# Patient Record
Sex: FEMALE | Race: WHITE | Employment: UNEMPLOYED | URBAN - METROPOLITAN AREA
[De-identification: names, ages, dates, MRNs, and addresses within clinical notes are randomized per-mention and may not be internally consistent; named-entity substitution may affect disease eponyms.]

---

## 2021-02-26 ENCOUNTER — HOSPITAL ENCOUNTER (OUTPATIENT)
Dept: NON INVASIVE DIAGNOSTICS | Age: 51
Discharge: HOME | End: 2021-02-26
Payer: COMMERCIAL

## 2021-02-26 ENCOUNTER — HOSPITAL ENCOUNTER (OUTPATIENT)
Dept: CT IMAGING | Age: 51
Discharge: HOME | End: 2021-02-26
Payer: COMMERCIAL

## 2021-02-26 DIAGNOSIS — J32.0 CHRONIC MAXILLARY SINUSITIS: ICD-10-CM

## 2021-02-26 LAB
LV EF: 58 %
LVEF MODALITY: NORMAL

## 2021-02-26 PROCEDURE — 70486 CT MAXILLOFACIAL W/O DYE: CPT

## 2021-02-26 PROCEDURE — 93306 TTE W/DOPPLER COMPLETE: CPT

## 2021-03-09 ENCOUNTER — HOSPITAL ENCOUNTER (OUTPATIENT)
Age: 51
Discharge: HOME | End: 2021-03-09
Payer: COMMERCIAL

## 2021-03-09 ENCOUNTER — HOSPITAL ENCOUNTER (OUTPATIENT)
Dept: GENERAL RADIOLOGY | Age: 51
Discharge: HOME | End: 2021-03-09
Payer: COMMERCIAL

## 2021-03-09 DIAGNOSIS — R07.89 OTHER CHEST PAIN: ICD-10-CM

## 2021-03-09 PROCEDURE — 71046 X-RAY EXAM CHEST 2 VIEWS: CPT

## 2021-04-09 ENCOUNTER — OFFICE VISIT (OUTPATIENT)
Dept: CARDIOLOGY CLINIC | Age: 51
End: 2021-04-09
Payer: COMMERCIAL

## 2021-04-09 VITALS
OXYGEN SATURATION: 96 % | HEART RATE: 72 BPM | RESPIRATION RATE: 16 BRPM | BODY MASS INDEX: 20.59 KG/M2 | HEIGHT: 67 IN | SYSTOLIC BLOOD PRESSURE: 120 MMHG | DIASTOLIC BLOOD PRESSURE: 70 MMHG | WEIGHT: 131.2 LBS

## 2021-04-09 DIAGNOSIS — I51.89 LEFT ATRIAL MASS: ICD-10-CM

## 2021-04-09 DIAGNOSIS — R07.9 CHEST PAIN, UNSPECIFIED TYPE: Primary | ICD-10-CM

## 2021-04-09 PROCEDURE — G8427 DOCREV CUR MEDS BY ELIG CLIN: HCPCS | Performed by: INTERNAL MEDICINE

## 2021-04-09 PROCEDURE — 99204 OFFICE O/P NEW MOD 45 MIN: CPT | Performed by: INTERNAL MEDICINE

## 2021-04-09 PROCEDURE — G8420 CALC BMI NORM PARAMETERS: HCPCS | Performed by: INTERNAL MEDICINE

## 2021-04-09 RX ORDER — OMEPRAZOLE 20 MG/1
20 CAPSULE, DELAYED RELEASE ORAL
Qty: 90 CAPSULE | Refills: 3 | Status: SHIPPED | OUTPATIENT
Start: 2021-04-09

## 2021-04-09 RX ORDER — LEVOTHYROXINE SODIUM 0.03 MG/1
TABLET ORAL
COMMUNITY
Start: 2021-02-16

## 2021-04-09 RX ORDER — AZELASTINE 1 MG/ML
SPRAY, METERED NASAL
COMMUNITY
Start: 2021-02-16

## 2021-04-09 RX ORDER — MEDROXYPROGESTERONE ACETATE 2.5 MG/1
TABLET ORAL
COMMUNITY
Start: 2021-02-19

## 2021-04-09 RX ORDER — FLUTICASONE PROPIONATE 50 MCG
1 SPRAY, SUSPENSION (ML) NASAL DAILY
COMMUNITY

## 2021-04-09 RX ORDER — CETIRIZINE HYDROCHLORIDE 10 MG/1
10 TABLET ORAL DAILY
COMMUNITY

## 2021-04-09 RX ORDER — ESTRADIOL 1 MG/1
1 TABLET ORAL DAILY
COMMUNITY

## 2021-04-09 SDOH — HEALTH STABILITY: MENTAL HEALTH: HOW OFTEN DO YOU HAVE A DRINK CONTAINING ALCOHOL?: NOT ASKED

## 2021-04-09 SDOH — HEALTH STABILITY: MENTAL HEALTH: HOW MANY STANDARD DRINKS CONTAINING ALCOHOL DO YOU HAVE ON A TYPICAL DAY?: NOT ASKED

## 2021-04-09 NOTE — PROGRESS NOTES
[address]  Cardiac Consult     Referring Provider:  YNES Mustafa     Chief Complaint   Patient presents with    New Patient     Chest pain- ECHO results        History of Present Illness:  45 y.o. female seen in consultation at the request of Yuly Katz for chest pain and abnormal ECHO. She has been having mid chest tightness for several weeks. It seem to be exacerbated by stress. Also seemed worsened by caffeine. Stopped caffeine a few weeks ago and improved but no resolved. No other clear exacerbating or relieving factors. Due to this her PCP ordered and ECHO it showed a possible mass vs artifact in the left atrium. Past Medical History:   has no past medical history on file. Surgical History:   has no past surgical history on file. Social History:  Social History     Tobacco Use    Smoking status: Never Smoker    Smokeless tobacco: Never Used   Substance Use Topics    Alcohol use: Not Currently        Family History:  Negative for premature CAD    Allergies:  Sulfa antibiotics and Penicillins     Home Medications:  Prior to Visit Medications    Medication Sig Taking? Authorizing Provider   azelastine (ASTELIN) 0.1 % nasal spray USE 1 SPRAY IN EACH NOSTRIL TWICE DAILY Yes Historical Provider, MD   cetirizine (ZYRTEC) 10 MG tablet Take 10 mg by mouth daily Yes Historical Provider, MD   estradiol (ESTRACE) 1 MG tablet Take 1 mg by mouth daily Yes Historical Provider, MD   fluticasone (FLONASE) 50 MCG/ACT nasal spray 1 spray by Nasal route daily Yes Historical Provider, MD   levothyroxine (SYNTHROID) 25 MCG tablet TAKE 1 TABLET BY MOUTH EVERY DAY IN THE MORNING ON AN EMPTY STOMACH Yes Historical Provider, MD   medroxyPROGESTERone (PROVERA) 2.5 MG tablet  Yes Historical Provider, MD       [x] Medications and dosages reviewed.     ROS:  [x]Full ROS obtained and negative except as mentioned in HPI      Physical Examination:    Vitals:    04/09/21 0859   BP: 120/70   Site: Left Upper Arm   Position: Sitting   Cuff Size: Medium Adult   Pulse: 72   Resp: 16   SpO2: 96%   Weight: 131 lb 3.2 oz (59.5 kg)   Height: 5' 7\" (1.702 m)        · GENERAL: Well developed, well nourished, No acute distress  · NEUROLOGICAL: Alert and oriented  · PSYCH: Calm affect  · SKIN: Warm and dry, No visible rash,   · EYES: Pupils equal and round, Sclera non-icteric,   · HENT:  External ears and nose unremarkable, mucus membranes moist  · MUSCULOSKELETAL: Normal cephalic, neck supple  · CAROTID: Normal upstroke, no bruits  · CARDIAC: JVP normal, Normal PMI, regular rate and rhythm, normal S1S2, no murmur, rub, or gallop  · RESPIRATORY: Normal respiratory effort, clear to auscultation bilaterally  · EXTREMITIES: No edema  · GASTROINTESTINAL: normal bowel sounds, soft, non-tender, No hepatomegaly     ECHO  Summary   -Normal left ventricle size, wall thickness, and systolic function with an   estimated ejection fraction of 55-60%. -Normal diastolic function.   -Aortic valve appears sclerotic but opens adequately. -Trivial tricuspid regurgitation with a PASP of 18 mmHg.   -Trivial pulmonic regurgitation present   -There is a mobile density seen in one view in the left atrium. This may   represent reverberation artifact, however, a mass cannot be excluded. If   clinically indicated a CHARLIE could be considered for evaluation. EKG 2/21 (Tracings reviewed)  NSR, normal    Assessment:     Chest Pain:  Atypical. Doubt CAD. Check GXT    GERD:  Some issues with GERD  Start omeprozole 20 mg    Possible left atrial mass:   Reviewed with pt and  including ECHO images. Discussed options. I recommended CHARLIE. She is very hesitant to have this and would prefer MRI. I called and discussed with Dr. Raul Hill. He also feels CHARLIE would be preferable, but he likely could tell if a mass is present on MRI, although it is small. She was considering CHARLIE but refuses to have COVID testing.  There is a question of facial nerve injury from a prior COVID test.  Plan MRI        Plan:  Cardiac MRI  GXT  Omeprazole 20  F/u to review    Thank you for allowing me to participate in the care of this individual.      Gaetano Serrano M.D., [facility] [place]

## 2021-05-03 ENCOUNTER — TELEPHONE (OUTPATIENT)
Dept: CARDIOLOGY CLINIC | Age: 51
End: 2021-05-03

## 2021-05-03 ENCOUNTER — HOSPITAL ENCOUNTER (OUTPATIENT)
Dept: MRI IMAGING | Age: 51
Discharge: HOME | End: 2021-05-03
Payer: COMMERCIAL

## 2021-05-03 DIAGNOSIS — I51.89 LEFT ATRIAL MASS: ICD-10-CM

## 2021-05-03 LAB
LV EF: 57 %
LVEF MODALITY: NORMAL

## 2021-05-03 PROCEDURE — 75565 CARD MRI VELOC FLOW MAPPING: CPT | Performed by: INTERNAL MEDICINE

## 2021-05-03 PROCEDURE — 75561 CARDIAC MRI FOR MORPH W/DYE: CPT

## 2021-05-03 PROCEDURE — 75561 CARDIAC MRI FOR MORPH W/DYE: CPT | Performed by: INTERNAL MEDICINE

## 2021-05-03 PROCEDURE — A9585 GADOBUTROL INJECTION: HCPCS | Performed by: INTERNAL MEDICINE

## 2021-05-03 PROCEDURE — 6360000004 HC RX CONTRAST MEDICATION: Performed by: INTERNAL MEDICINE

## 2021-05-03 RX ADMIN — GADOBUTROL 9 ML: 604.72 INJECTION INTRAVENOUS at 09:30

## 2021-05-03 NOTE — TELEPHONE ENCOUNTER
LMOM for patient regarding results and fu 5/5. Asked her to call back to discuss. ----- Message from Josefa Christine MD sent at 5/3/2021 12:37 PM EDT -----  MRI was normal. No mass seen. F/u as planned to discuss.     1 [facility]

## 2021-05-03 NOTE — TELEPHONE ENCOUNTER
----- Message from Deidre Brandt MD sent at 5/3/2021 12:37 PM EDT -----  MRI was normal. No mass seen. F/u as planned to discuss.     MMK    Spoke with the patient and advised her of the message above per MMK and RN . Patient voiced understanding and will be at appt on 05/05.

## 2021-05-05 ENCOUNTER — TELEPHONE (OUTPATIENT)
Dept: CARDIOLOGY CLINIC | Age: 51
End: 2021-05-05

## 2021-05-05 ENCOUNTER — OFFICE VISIT (OUTPATIENT)
Dept: CARDIOLOGY CLINIC | Age: 51
End: 2021-05-05
Payer: COMMERCIAL

## 2021-05-05 VITALS
WEIGHT: 131 LBS | RESPIRATION RATE: 18 BRPM | HEART RATE: 81 BPM | OXYGEN SATURATION: 98 % | SYSTOLIC BLOOD PRESSURE: 92 MMHG | HEIGHT: 67 IN | BODY MASS INDEX: 20.56 KG/M2 | DIASTOLIC BLOOD PRESSURE: 60 MMHG

## 2021-05-05 DIAGNOSIS — I51.89 LEFT ATRIAL MASS: Primary | ICD-10-CM

## 2021-05-05 PROCEDURE — 99214 OFFICE O/P EST MOD 30 MIN: CPT | Performed by: INTERNAL MEDICINE

## 2021-05-05 PROCEDURE — 1036F TOBACCO NON-USER: CPT | Performed by: INTERNAL MEDICINE

## 2021-05-05 PROCEDURE — G8427 DOCREV CUR MEDS BY ELIG CLIN: HCPCS | Performed by: INTERNAL MEDICINE

## 2021-05-05 PROCEDURE — 3017F COLORECTAL CA SCREEN DOC REV: CPT | Performed by: INTERNAL MEDICINE

## 2021-05-05 PROCEDURE — G8420 CALC BMI NORM PARAMETERS: HCPCS | Performed by: INTERNAL MEDICINE

## 2021-05-05 RX ORDER — GABAPENTIN 100 MG/1
CAPSULE ORAL
COMMUNITY
Start: 2021-04-08

## 2021-05-05 RX ORDER — CIPROFLOXACIN 500 MG/1
TABLET, FILM COATED ORAL
COMMUNITY
Start: 2021-05-04

## 2021-05-05 SDOH — HEALTH STABILITY: MENTAL HEALTH: HOW OFTEN DO YOU HAVE A DRINK CONTAINING ALCOHOL?: MONTHLY OR LESS

## 2021-05-05 NOTE — PROGRESS NOTES
-Normal LV global longitudinal strain (GLS)    -Normal right ventricular size and systolic function with a calculated ejection fraction of 62% by Miranda's method.   -No intracardiac shunt. Calculated Qp:Qs:1 (Phase contrast velocity encoding Ao/Pa)   -Normal myocardial resting perfusion imaging.   -On delayed enhancement imaging, there is no abnormal hyperenhancement to suggest myocardial scar/inflammation/infiltration.       The MRI sequences and imaging planes in this study were tailored for cardiac imaging and are suboptimal for evaluation of non-cardiac structures. Assessment:     Chest Pain:  Resolved. Follow    GERD:  Improved    Possible left atrial mass:   MRI with no mass  Repeat quick look ECHO in office and no mass  Appears to have been reverberation from aortic valve and artifactual.  No further evaluation indicated.   Discussed with pt and  in detail and reviewed images with them-35 mins spent       Plan:  Stable cardiac status  F/u prn    Thank you for allowing me to participate in the care of this individual.      Johann Bhatti M.D., [address]

## 2021-06-09 ENCOUNTER — OFFICE VISIT (OUTPATIENT)
Dept: ENT CLINIC | Age: 51
End: 2021-06-09
Payer: COMMERCIAL

## 2021-06-09 VITALS
DIASTOLIC BLOOD PRESSURE: 70 MMHG | WEIGHT: 131 LBS | BODY MASS INDEX: 20.52 KG/M2 | HEART RATE: 71 BPM | SYSTOLIC BLOOD PRESSURE: 102 MMHG

## 2021-06-09 DIAGNOSIS — M54.2 NECK PAIN: Chronic | ICD-10-CM

## 2021-06-09 DIAGNOSIS — G50.1 ATYPICAL FACIAL PAIN: Primary | Chronic | ICD-10-CM

## 2021-06-09 PROCEDURE — 1036F TOBACCO NON-USER: CPT | Performed by: OTOLARYNGOLOGY

## 2021-06-09 PROCEDURE — G8420 CALC BMI NORM PARAMETERS: HCPCS | Performed by: OTOLARYNGOLOGY

## 2021-06-09 PROCEDURE — G8427 DOCREV CUR MEDS BY ELIG CLIN: HCPCS | Performed by: OTOLARYNGOLOGY

## 2021-06-09 PROCEDURE — 99203 OFFICE O/P NEW LOW 30 MIN: CPT | Performed by: OTOLARYNGOLOGY

## 2021-06-09 PROCEDURE — 3017F COLORECTAL CA SCREEN DOC REV: CPT | Performed by: OTOLARYNGOLOGY

## 2021-06-09 ASSESSMENT — ENCOUNTER SYMPTOMS
SORE THROAT: 0
SINUS PAIN: 0
RHINORRHEA: 0

## 2021-06-09 NOTE — PROGRESS NOTES
[address] [facility]       NEW PATIENT VISIT      PCP:  YNES Kenney      REFERRED BY:   Lazarus Effect      CHIEF COMPLAINT  Chief Complaint   Patient presents with    Otalgia       HISTORY OF PRESENT ILLNESS       Mariya Vogel is a 45 y.o. female here for evaluation and treatment of Pain in right side of face including ear. Started in anterior cheek and went to ear. Seen a lot of doctors. CNP thought sinus issues and couple rounds of antibiotics and then sent to Dr. Kee Hinojosa.  looks at CT scan of sinuses and said have a deviated septum and a pocket of air somethere but said my sinuses looked okay. Seen by neurologist ,endodontist, dentist.  No causes found. In December went to emergency room and did a COVID-19 test. Very painful and shoved up so far felt like grinding or crunching sensation and face went numb. she said that's normal and would go away in 20 minutes. Pain is at 10 every day. Feels like something in face not supposed to be there creepy crawly feeling. Neurologist Chandrakant Alejo, said comibination of the test and sinuses. Fall and spring seasonal allergies. Sinus infection twice a year. REVIEW OF SYSTEMS   Review of Systems   Constitutional: Negative for chills and fever. HENT: Positive for congestion. Negative for ear discharge, ear pain, hearing loss, rhinorrhea, sinus pain, sore throat and tinnitus. PAST MEDICAL HISTORY    No past medical history on file. No past surgical history on file. EXAMINATION      Vitals:    06/09/21 1448   BP: 102/70   Site: Left Upper Arm   Position: Sitting   Cuff Size: Medium Adult   Pulse: 71   Weight: 131 lb (59.4 kg)         Physical Exam  Vitals reviewed. Constitutional:       General: She is awake. She is not in acute distress. Appearance: Normal appearance. She is well-developed. She is not ill-appearing or toxic-appearing.    HENT:      Head: Normocephalic and atraumatic. Salivary Glands: Right salivary gland is not diffusely enlarged or tender. Left salivary gland is not diffusely enlarged or tender. Right Ear: Tympanic membrane, ear canal and external ear normal.      Left Ear: Tympanic membrane, ear canal and external ear normal.      Nose: Nose normal. No nasal deformity, septal deviation, mucosal edema, congestion or rhinorrhea. Right Turbinates: Not enlarged. Left Turbinates: Not enlarged. Right Sinus: No maxillary sinus tenderness or frontal sinus tenderness. Left Sinus: No maxillary sinus tenderness or frontal sinus tenderness. Mouth/Throat:      Lips: Pink. No lesions. Mouth: Mucous membranes are moist. No oral lesions. Tongue: No lesions. Palate: No mass and lesions. Pharynx: Oropharynx is clear. Uvula midline. No oropharyngeal exudate or posterior oropharyngeal erythema. Tonsils: No tonsillar exudate or tonsillar abscesses. Neck:      Thyroid: No thyroid mass, thyromegaly or thyroid tenderness. Trachea: No tracheal deviation. Musculoskeletal:      Cervical back: Normal range of motion and neck supple. No rigidity. No muscular tenderness. Lymphadenopathy:      Cervical: No cervical adenopathy. Neurological:      Mental Status: She is alert. Katja Reece / Cale Gallardo / Quinten Couch was seen today for otalgia. Diagnoses and all orders for this visit:    Atypical facial pain    Neck pain  Comments:  under ear. Facial pain. Neck pain under ear. RECOMMENDATIONS/PLAN      1. Acetaminophen (eg. Tylenol) or Ibuprofen (eg. Advil) (over the counter medications) as needed for pain. 2. Follow up with neurologist regarding atypical facial pain. 3. Return if symptoms worsen or fail to clear is 6-8 weeks, or, for any further ENT or sinus problems or symptoms.             MEDICAL DECISION MAKING  # and complexity of problems addressed:  62496 - Moderate  1 undiagnosed new